# Patient Record
Sex: FEMALE | Race: WHITE | URBAN - METROPOLITAN AREA
[De-identification: names, ages, dates, MRNs, and addresses within clinical notes are randomized per-mention and may not be internally consistent; named-entity substitution may affect disease eponyms.]

---

## 2022-02-21 ENCOUNTER — OFFICE VISIT (OUTPATIENT)
Dept: NEUROLOGY | Age: 27
End: 2022-02-21
Payer: COMMERCIAL

## 2022-02-21 DIAGNOSIS — G56.03 CARPAL TUNNEL SYNDROME, BILATERAL UPPER LIMBS: Primary | ICD-10-CM

## 2022-02-21 PROCEDURE — 95886 MUSC TEST DONE W/N TEST COMP: CPT | Performed by: PHYSICAL MEDICINE & REHABILITATION

## 2022-02-21 PROCEDURE — 95911 NRV CNDJ TEST 9-10 STUDIES: CPT | Performed by: PHYSICAL MEDICINE & REHABILITATION

## 2022-02-21 NOTE — PROGRESS NOTES
EMG    Risks and benefits of study discussed. Specific and common risks of pain and bleeding as well as uncommon side effects of infection, hematoma and vasovagal episodes    Patient agreeable to testing and consents to such. Clinical: 4 years of numb, tingly painful hands bilaterally    Motor NCS:  Median amplitudes are normal with moderately slow latencies on both sides; velocities normal  Ulnar amplitudes, latencies, velocities normal bilateral    Sensory NCS:  Median amplitudes moderately depressed with moderate to severely slow sensory latencies, slightly worse on the right. 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 Carpal tunnel syndrome symptoms as clinical correlate. #2  Moderate median neuropathy at the left wrist, also with carpal tunnel syndrome symptoms. #3  No evidence to suggest radiculopathy, plexopathy or generalized peripheral neuropathy. *Due to the severity of median neuropathies described above, referral to an upper extremity surgical specialist would be recommended.